# Patient Record
Sex: FEMALE | Race: WHITE | NOT HISPANIC OR LATINO | URBAN - METROPOLITAN AREA
[De-identification: names, ages, dates, MRNs, and addresses within clinical notes are randomized per-mention and may not be internally consistent; named-entity substitution may affect disease eponyms.]

---

## 2017-02-21 ENCOUNTER — OFFICE (OUTPATIENT)
Dept: URBAN - METROPOLITAN AREA CLINIC 33 | Facility: CLINIC | Age: 27
End: 2017-02-21

## 2017-02-21 VITALS
HEIGHT: 63 IN | SYSTOLIC BLOOD PRESSURE: 105 MMHG | DIASTOLIC BLOOD PRESSURE: 74 MMHG | HEART RATE: 87 BPM | WEIGHT: 128 LBS | TEMPERATURE: 97.5 F

## 2017-02-21 DIAGNOSIS — R14.0 ABDOMINAL DISTENSION (GASEOUS): ICD-10-CM

## 2017-02-21 DIAGNOSIS — K21.9 GASTRO-ESOPHAGEAL REFLUX DISEASE WITHOUT ESOPHAGITIS: ICD-10-CM

## 2017-02-21 DIAGNOSIS — R10.9 UNSPECIFIED ABDOMINAL PAIN: ICD-10-CM

## 2017-02-21 DIAGNOSIS — K59.09 OTHER CONSTIPATION: ICD-10-CM

## 2017-02-21 DIAGNOSIS — M79.643 PAIN IN UNSPECIFIED HAND: ICD-10-CM

## 2017-02-21 PROCEDURE — 99244 OFF/OP CNSLTJ NEW/EST MOD 40: CPT

## 2017-02-21 NOTE — SERVICEHPINOTES
CORBIN PALENCIA   is a   26   year old    female who is being seen in consultation at the request of   HÉCTOR VALADEZ   for constipation and GERD. She has been having issues with constipation and GERD. She normally is very regular, has a BM after a cup of coffee. The last 2 months has had issues with constipation. She has a few small BM's daily, then skips 2-3 days. Stools vary between [place] stool scale type 1, 2 and 6. Pasta and rice causes constipation. To much dairy causes indigestion. Peanut butter causes GERD. She recently took protonix for one month with relief of GERD. Since stopping it she has had GERD once with eating sausages. She recently did the ketogenic diet and her bowels and GI symptoms improved. Notes occasional mucous. No blood. Notes pain to the left of umbilicus, usually subsides with defecation, however can linger at times. Notes bloating at times, worse when she used Miralax recently. One day she noted a rash on her arms, but that subsided. She gets hives if she gets to hot, but this has been occurring for years.  She reports occasional blurred vision, she reports having a recent eye exam told to wear glasses more often instead of contacts. She reports having bilateral hand pain, knee pain. She was a cheerleader for years. She has had arthroscopic knee surgeries. She has seen rheumatology has had various opinions, for possible RA. Denies weight loss. No family history of IBD. Her mother has diverticulosis. No family history of colon cancer.Abdominal x-ray 1/24/17-moderate to large stool volume.BRLabs 1/27/17- H.pylori stool antigen-negative, normal lipase and amylase, TSH-0.93, and unremarkable CBC and CMP.She has been on topiramate intermittently for about 3 years.

## 2017-02-27 LAB
CELIAC DISEASE COMP PANEL: IGA, SERUM: 194 MG/DL (ref 81–463)
CELIAC DISEASE COMP PANEL: INTERPRETATION: (no result)
CELIAC DISEASE COMP PANEL: TISSUE TRANSGLUTAMINASE AB IGA: 1 U/ML

## 2017-03-21 ENCOUNTER — OFFICE (OUTPATIENT)
Dept: URBAN - METROPOLITAN AREA CLINIC 33 | Facility: CLINIC | Age: 27
End: 2017-03-21

## 2017-03-21 VITALS
DIASTOLIC BLOOD PRESSURE: 84 MMHG | WEIGHT: 128 LBS | HEIGHT: 63 IN | SYSTOLIC BLOOD PRESSURE: 112 MMHG | HEART RATE: 73 BPM | TEMPERATURE: 98.1 F

## 2017-03-21 DIAGNOSIS — K59.09 OTHER CONSTIPATION: ICD-10-CM

## 2017-03-21 DIAGNOSIS — R14.0 ABDOMINAL DISTENSION (GASEOUS): ICD-10-CM

## 2017-03-21 PROCEDURE — 99214 OFFICE O/P EST MOD 30 MIN: CPT

## 2017-03-21 NOTE — SERVICEHPINOTES
CORBIN PALENCIA   is a   26   year old    female who is being seen in consultation at the request of   GIBSON IVY   for follow up. Abdominal x-ray 1/24/17-moderate to large stool volume.BRLabs 1/27/17- H.pylori stool antigen-negative, normal lipase and amylase, TSH-0.93, and unremarkable CBC and CMP. 2/23/2017 celiac panel TTG IgA &lt1.  She has been following the low FODMAP diet since last month and isn't experiencing bloating or abdominal pain anymore.  She has a BM about twice a week. Stools are [place] stool type 1. She tried Miralax BID over a month ago, but that made bloating worse and didn't help with constipation. Continues to note mucous in stools, no blood present. Overall GERD has improved with diet changes, hasn't needed to take any PPI's or H2 blockers.

## 2017-04-10 ENCOUNTER — OFFICE (OUTPATIENT)
Dept: URBAN - METROPOLITAN AREA CLINIC 32 | Facility: CLINIC | Age: 27
End: 2017-04-10
Payer: COMMERCIAL

## 2017-04-10 VITALS
RESPIRATION RATE: 16 BRPM | OXYGEN SATURATION: 100 % | HEART RATE: 84 BPM | DIASTOLIC BLOOD PRESSURE: 82 MMHG | TEMPERATURE: 98.6 F | HEIGHT: 63 IN | HEART RATE: 76 BPM | SYSTOLIC BLOOD PRESSURE: 100 MMHG | DIASTOLIC BLOOD PRESSURE: 63 MMHG | WEIGHT: 128 LBS | SYSTOLIC BLOOD PRESSURE: 119 MMHG

## 2017-04-10 DIAGNOSIS — R14.0 ABDOMINAL DISTENSION (GASEOUS): ICD-10-CM

## 2017-04-10 DIAGNOSIS — K59.09 OTHER CONSTIPATION: ICD-10-CM

## 2017-04-10 PROCEDURE — 45378 DIAGNOSTIC COLONOSCOPY: CPT

## 2017-04-10 PROCEDURE — 00810: CPT | Mod: QS,AA

## 2017-04-10 PROCEDURE — 00810: CPT | Mod: AA,QS

## 2017-05-02 ENCOUNTER — OFFICE (OUTPATIENT)
Dept: URBAN - METROPOLITAN AREA CLINIC 33 | Facility: CLINIC | Age: 27
End: 2017-05-02

## 2017-05-02 VITALS
TEMPERATURE: 98.2 F | HEART RATE: 90 BPM | HEIGHT: 63 IN | SYSTOLIC BLOOD PRESSURE: 122 MMHG | WEIGHT: 124 LBS | DIASTOLIC BLOOD PRESSURE: 88 MMHG

## 2017-05-02 DIAGNOSIS — K59.09 OTHER CONSTIPATION: ICD-10-CM

## 2017-05-02 PROCEDURE — 99214 OFFICE O/P EST MOD 30 MIN: CPT

## 2018-05-03 ENCOUNTER — OFFICE (OUTPATIENT)
Dept: URBAN - METROPOLITAN AREA CLINIC 101 | Facility: CLINIC | Age: 28
End: 2018-05-03

## 2018-05-03 VITALS
WEIGHT: 129 LBS | DIASTOLIC BLOOD PRESSURE: 83 MMHG | TEMPERATURE: 98.2 F | HEIGHT: 63 IN | SYSTOLIC BLOOD PRESSURE: 115 MMHG | HEART RATE: 97 BPM

## 2018-05-03 DIAGNOSIS — K59.09 OTHER CONSTIPATION: ICD-10-CM

## 2018-05-03 DIAGNOSIS — K64.8 OTHER HEMORRHOIDS: ICD-10-CM

## 2018-05-03 DIAGNOSIS — T78.1XXA OTHER ADVERSE FOOD REACTIONS, NOT ELSEWHERE CLASSIFIED, INIT: ICD-10-CM

## 2018-05-03 PROCEDURE — 99214 OFFICE O/P EST MOD 30 MIN: CPT

## 2018-05-03 RX ORDER — LINACLOTIDE 145 UG/1
145 CAPSULE, GELATIN COATED ORAL
Qty: 30 | Refills: 11 | Status: ACTIVE
Start: 2017-04-25

## 2018-05-03 NOTE — SERVICEHPINOTES
CORBIN PALENCIA   is a   27  female who presents for f/u of constipation. She had a colonoscopy on 4/2017, revealed mildly tortuous colon, internal hemorrhoids, otherwise unremarkable. She takes Linzess 145mcg daily and has a BM daily without issues. No blood in the stool. She believes she may be allergic to certain foods as she has gotten a mild rash on her face twice after eating, she does not remember what she was eating but does not think it was the same foods. She does have seasonal allergies. BRShe also reports issues with hemorrhoids flaring up recently with rectal pain. She took OTC suppositories, and Miralax qhs which improved her symptoms in a few days. No longer having any issues. No rectal bleeding. She would like to know how to prevent this.

## 2019-06-20 ENCOUNTER — OFFICE (OUTPATIENT)
Dept: URBAN - METROPOLITAN AREA CLINIC 101 | Facility: CLINIC | Age: 29
End: 2019-06-20

## 2019-06-20 VITALS
SYSTOLIC BLOOD PRESSURE: 112 MMHG | TEMPERATURE: 99 F | HEIGHT: 63 IN | HEART RATE: 92 BPM | WEIGHT: 127 LBS | DIASTOLIC BLOOD PRESSURE: 81 MMHG

## 2019-06-20 DIAGNOSIS — K59.00 CONSTIPATION, UNSPECIFIED: ICD-10-CM

## 2019-06-20 PROCEDURE — 99213 OFFICE O/P EST LOW 20 MIN: CPT

## 2019-06-20 NOTE — SERVICEHPINOTES
CORBIN PALENCIA   is a   29  female who presents for yearly follow up and med refill of Linzess. She takes Linzess 145mcg daily and has a daily BM. She usually has urgency after eating, which she is ok with but would like to know if there is a lower dose. She denies any abd pain, rectal bleeding. She did a food sensitivity test that she found online and has been avoiding these foods (gluten, dairy, eggs). No further complaints today.BRColonoscopy on 4/2017, revealed mildly tortuous colon, internal hemorrhoids, otherwise unremarkable. 
No

## 2024-12-08 NOTE — SERVICEHPINOTES
CORBIN PALENCIA   is a   26   year old    female who is being seen in consultation at the request of   GIBSON IVY   for follow up for constipation. Had abdominal x-ray 1/24/17-moderate to large stool volume.  Labs 1/27/17- H.pylori stool antigen-negative, normal lipase and amylase, TSH-0.93, and unremarkable CBC and CMP. 2/23/2017 celiac panel TTG IgA &lt1. She has been following the low FODMAP diet since last couple of months and isn't experiencing bloating or abdominal pain anymore.  She had a colonoscopy 4/10/2017-mildly tortuous colon and internal hemorrhoids. After her colonoscopy she was started on Linzess 145mcg daily. She has a BM daily. Stools are BSS type 3-5 and rarely type 1. Overall she is quite pleased with her progress.  
Previously Declined (within the last year)